# Patient Record
Sex: MALE | Race: ASIAN | Employment: UNEMPLOYED | URBAN - METROPOLITAN AREA
[De-identification: names, ages, dates, MRNs, and addresses within clinical notes are randomized per-mention and may not be internally consistent; named-entity substitution may affect disease eponyms.]

---

## 2024-03-25 ENCOUNTER — HOSPITAL ENCOUNTER (EMERGENCY)
Age: 29
Discharge: HOME | End: 2024-03-25

## 2024-03-25 VITALS
BODY MASS INDEX: 26.7 KG/M2 | WEIGHT: 176.15 LBS | OXYGEN SATURATION: 99 % | HEIGHT: 68 IN | DIASTOLIC BLOOD PRESSURE: 81 MMHG | RESPIRATION RATE: 18 BRPM | HEART RATE: 68 BPM | SYSTOLIC BLOOD PRESSURE: 122 MMHG | TEMPERATURE: 97.6 F

## 2024-03-25 DIAGNOSIS — S00.01XA ABRASION OF SCALP, INITIAL ENCOUNTER: ICD-10-CM

## 2024-03-25 DIAGNOSIS — Z23 NEED FOR TETANUS BOOSTER: ICD-10-CM

## 2024-03-25 DIAGNOSIS — S09.90XA INJURY OF HEAD, INITIAL ENCOUNTER: Primary | ICD-10-CM

## 2024-03-25 PROCEDURE — 99284 EMERGENCY DEPT VISIT MOD MDM: CPT

## 2024-03-25 PROCEDURE — 90471 IMMUNIZATION ADMIN: CPT | Performed by: PHYSICIAN ASSISTANT

## 2024-03-25 PROCEDURE — 90715 TDAP VACCINE 7 YRS/> IM: CPT | Performed by: PHYSICIAN ASSISTANT

## 2024-03-25 PROCEDURE — 6360000002 HC RX W HCPCS: Performed by: PHYSICIAN ASSISTANT

## 2024-03-25 RX ADMIN — TETANUS TOXOID, REDUCED DIPHTHERIA TOXOID AND ACELLULAR PERTUSSIS VACCINE, ADSORBED 0.5 ML: 5; 2.5; 8; 8; 2.5 SUSPENSION INTRAMUSCULAR at 19:20

## 2024-03-25 ASSESSMENT — PAIN - FUNCTIONAL ASSESSMENT: PAIN_FUNCTIONAL_ASSESSMENT: NONE - DENIES PAIN

## 2024-03-25 NOTE — ED PROVIDER NOTES
[facility] EMERGENCY DEPARTMENT  EMERGENCY DEPARTMENT ENCOUNTER        Pt Name: Thiago Boateng  MRN: [id number]  Birthdate 1995  Date of evaluation: 3/25/2024  Provider: Meredith Rodriguez PA-C  PCP: No primary care provider on file.  Note Started: 7:19 PM EDT 3/25/24      SACHIN. I have evaluated this patient.        CHIEF COMPLAINT       Chief Complaint   Patient presents with    Head Injury     Pt to ED with c/o  top of head injury that happened at work 2145. Pt stated it was a \"copper pointy\" material. Per pt there was little blood on the sight of head where injury occurred.  Pt denies LOC. Pt states no pain at the moment, just a burning sensation.        HISTORY OF PRESENT ILLNESS: 1 or more Elements     History From: patient    Thiago Boateng is a 28 y.o. male who presents for head injury and scalp abrasion that occurred yesterday evening.  Patient works in a machine shop.  He was leaning over and forgot there was pointy part of the machine above his head.  This piece is shaped like a funnel and is about 25 mm wide. It is a guide for a 3 mm piece of copper that comes through it.  He accidentally hit his head on it.  Denies LOC.  Denies headache.  Has a burning pain around abrasion. Lost a few pieces of hair.  Unsure when last tetanus vaccination was. Denies vomiting. Denies headache.      Nursing Notes were reviewed and agreed with or any disagreements were addressed in the HPI.    REVIEW OF SYSTEMS :      Review of Systems    Positives and Pertinent negatives as per HPI.     SURGICAL HISTORY   History reviewed. No pertinent surgical history.    CURRENTMEDICATIONS       Previous Medications    No medications on file       ALLERGIES     Patient has no known allergies.    FAMILYHISTORY     History reviewed. No pertinent family history.     SOCIAL HISTORY       Social History     Tobacco Use    Smoking status: Some Days     Types: E-Cigarettes   Substance Use Topics    Alcohol use:

## 2024-03-25 NOTE — ED NOTES
Provider order placed for patient's discharge. Provider reviewed decision to discharge with the patient. Discharge paperwork and any prescriptions were reviewed with the patient. Patient verbalized understanding of discharge education and any prescriptions and has no further questions or further needs at this time. Patient left with all personal belongings and was stable upon departure. Patient thanked for choosing [facility] and informed to return should any need arise.

## 2024-03-25 NOTE — ED TRIAGE NOTES
Pt to ED with c/o head injury that happened at work [employer]145. Pt stated it was a \"copper pointy\" material. Per pt there was little blood on the sight of head where injury occurred.  Pt denies LOC. Pt states no pain at the moment, just a burning sensation. Pt stated he has gotten tetanus shot within the last 5 years.